# Patient Record
Sex: MALE | Race: WHITE | Employment: FULL TIME | URBAN - NONMETROPOLITAN AREA
[De-identification: names, ages, dates, MRNs, and addresses within clinical notes are randomized per-mention and may not be internally consistent; named-entity substitution may affect disease eponyms.]

---

## 2022-08-12 ENCOUNTER — OFFICE VISIT (OUTPATIENT)
Dept: FAMILY MEDICINE CLINIC | Age: 22
End: 2022-08-12
Payer: COMMERCIAL

## 2022-08-12 VITALS
WEIGHT: 163.6 LBS | BODY MASS INDEX: 26.29 KG/M2 | HEIGHT: 66 IN | SYSTOLIC BLOOD PRESSURE: 112 MMHG | RESPIRATION RATE: 16 BRPM | HEART RATE: 88 BPM | OXYGEN SATURATION: 98 % | DIASTOLIC BLOOD PRESSURE: 76 MMHG | TEMPERATURE: 97.3 F

## 2022-08-12 DIAGNOSIS — S05.02XA ABRASION OF LEFT CORNEA, INITIAL ENCOUNTER: Primary | ICD-10-CM

## 2022-08-12 PROCEDURE — 99203 OFFICE O/P NEW LOW 30 MIN: CPT | Performed by: NURSE PRACTITIONER

## 2022-08-12 RX ORDER — MOXIFLOXACIN 5 MG/ML
SOLUTION/ DROPS OPHTHALMIC
Qty: 1 EACH | Refills: 0 | Status: SHIPPED | OUTPATIENT
Start: 2022-08-12 | End: 2022-08-19

## 2022-08-12 RX ORDER — DEXTROAMPHETAMINE SULFATE, DEXTROAMPHETAMINE SACCHARATE, AMPHETAMINE ASPARTATE MONOHYDRATE, AND AMPHETAMINE SULFATE 6.25; 6.25; 6.25; 6.25 MG/1; MG/1; MG/1; MG/1
CAPSULE, EXTENDED RELEASE ORAL
COMMUNITY
Start: 2022-07-19

## 2022-08-12 SDOH — ECONOMIC STABILITY: FOOD INSECURITY: WITHIN THE PAST 12 MONTHS, YOU WORRIED THAT YOUR FOOD WOULD RUN OUT BEFORE YOU GOT MONEY TO BUY MORE.: NEVER TRUE

## 2022-08-12 SDOH — ECONOMIC STABILITY: FOOD INSECURITY: WITHIN THE PAST 12 MONTHS, THE FOOD YOU BOUGHT JUST DIDN'T LAST AND YOU DIDN'T HAVE MONEY TO GET MORE.: NEVER TRUE

## 2022-08-12 ASSESSMENT — PATIENT HEALTH QUESTIONNAIRE - PHQ9
SUM OF ALL RESPONSES TO PHQ QUESTIONS 1-9: 0
2. FEELING DOWN, DEPRESSED OR HOPELESS: 0
SUM OF ALL RESPONSES TO PHQ9 QUESTIONS 1 & 2: 0
1. LITTLE INTEREST OR PLEASURE IN DOING THINGS: 0
SUM OF ALL RESPONSES TO PHQ QUESTIONS 1-9: 0

## 2022-08-12 ASSESSMENT — ENCOUNTER SYMPTOMS
FOREIGN BODY SENSATION: 0
EYE DISCHARGE: 1
EYE PAIN: 1
DOUBLE VISION: 0
PHOTOPHOBIA: 1
EYE ITCHING: 0
EYE REDNESS: 1
VOMITING: 0
NAUSEA: 0
BLURRED VISION: 0

## 2022-08-12 ASSESSMENT — VISUAL ACUITY
OS_CC: 20/40
OU: 1

## 2022-08-12 ASSESSMENT — SOCIAL DETERMINANTS OF HEALTH (SDOH): HOW HARD IS IT FOR YOU TO PAY FOR THE VERY BASICS LIKE FOOD, HOUSING, MEDICAL CARE, AND HEATING?: NOT HARD AT ALL

## 2022-08-12 NOTE — PROGRESS NOTES
[address] [address] [address] [facility], APRN-[facility]  [address]  Phone:  [phone]  Fax:  [phone]  Peyton Delgado is a 25 y.o. male who presents today for his medical conditions/complaints as noted below. Peyton Delgado c/o of Eye Problem (Pt presents to walk in with complaints of left sided eye pain, watering, swelling. Patient says he woke up with it this morning and has continued to worsen throughout the day.)      HPI:     Eye Problem   The left eye is affected. This is a new problem. The current episode started today. The problem has been gradually worsening. Injury mechanism: wears contacts. The pain is at a severity of 7/10. There is No known exposure to pink eye. He Wears contacts. Associated symptoms include an eye discharge (clear tears), eye redness and photophobia. Pertinent negatives include no blurred vision, double vision, fever, foreign body sensation, itching, nausea, recent URI or vomiting. He has tried nothing for the symptoms. Patient wears contact lenses. Wt Readings from Last 3 Encounters:   08/12/22 163 lb 9.6 oz (74.2 kg)       Temp Readings from Last 3 Encounters:   08/12/22 97.3 °F (36.3 °C) (Tympanic)       BP Readings from Last 3 Encounters:   08/12/22 112/76       Pulse Readings from Last 3 Encounters:   08/12/22 88        SpO2 Readings from Last 3 Encounters:   08/12/22 98%             History reviewed. No pertinent past medical history. History reviewed. No pertinent surgical history. History reviewed. No pertinent family history.   Social History     Tobacco Use    Smoking status: Never    Smokeless tobacco: Never   Substance Use Topics    Alcohol use: Not Currently      Current Outpatient Medications   Medication Sig Dispense Refill    MYDAYIS 25 MG CP24 TAKE 1 CAPSULE BY MOUTH EVERY DAY IN THE MORNING      moxifloxacin (VIGAMOX) 0.5 % ophthalmic solution Place 1 drop into the left eye every 2 hours (while awake) for 1 day, THEN 1 drop every 6 hours for 6 days. 1 each 0     No current facility-administered medications for this visit. No Known Allergies    Vision Screening    Right eye Left eye Both eyes   Without correction      With correction  20/40        Subjective:      Review of Systems   Constitutional:  Negative for fever. Eyes:  Positive for photophobia, pain, discharge (clear tears), redness and visual disturbance (from tearing). Negative for blurred vision, double vision and itching. Gastrointestinal:  Negative for nausea and vomiting. Objective:     /76 (Site: Right Upper Arm, Position: Sitting, Cuff Size: Medium Adult)   Pulse 88   Temp 97.3 °F (36.3 °C) (Tympanic)   Resp 16   Ht 5' 6\" (1.676 m)   Wt 163 lb 9.6 oz (74.2 kg)   SpO2 98%   BMI 26.41 kg/m²     Physical Exam  Vitals reviewed. Constitutional:       Appearance: Normal appearance. HENT:      Head: Normocephalic. Eyes:      General: Lids are normal. Lids are everted, no foreign bodies appreciated. Vision grossly intact. Gaze aligned appropriately. Right eye: No foreign body, discharge or hordeolum. Left eye: No foreign body, discharge or hordeolum. Extraocular Movements: Extraocular movements intact. Right eye: Normal extraocular motion and no nystagmus. Left eye: Normal extraocular motion and no nystagmus. Conjunctiva/sclera:      Right eye: Right conjunctiva is not injected. No chemosis, exudate or hemorrhage. Left eye: Left conjunctiva is injected. No chemosis, exudate or hemorrhage. Pupils: Pupils are equal, round, and reactive to light. Left eye: Corneal abrasion and fluorescein uptake present. Comments: 11 o'clock     Neurological:      Mental Status: He is alert. Assessment:      Diagnosis Orders   1. Abrasion of left cornea, initial encounter  moxifloxacin (VIGAMOX) 0.5 % ophthalmic solution        No results found for this visit on 08/12/22.             Plan:    1 drop every other hour while awake for the first 24 hours. Then use 4 times daily. Follow up with Eye doctor tomorrow for re evaluation. Patient Instructions   Eye drop as directed. Follow up with eye doctor in 24 hours for a recheck. Patient/Caregiver instructed on use, benefit, and side effects of prescribed medications. All patient/parent/caregiver questions answered. Patient/parent/caregiver voiced understanding. Reviewed health maintenance. Instructed to continue current medications, diet and exercise. Patient agreed with treatment plan. Follow up as directed.            Electronically signed by ROMULO Ayoub NP on8/12/2022